# Patient Record
Sex: MALE | Race: WHITE | NOT HISPANIC OR LATINO
[De-identification: names, ages, dates, MRNs, and addresses within clinical notes are randomized per-mention and may not be internally consistent; named-entity substitution may affect disease eponyms.]

---

## 2023-02-20 ENCOUNTER — APPOINTMENT (OUTPATIENT)
Dept: ORTHOPEDIC SURGERY | Facility: CLINIC | Age: 48
End: 2023-02-20
Payer: COMMERCIAL

## 2023-02-20 VITALS — BODY MASS INDEX: 22.44 KG/M2 | HEIGHT: 67 IN | WEIGHT: 143 LBS

## 2023-02-20 DIAGNOSIS — Z82.49 FAMILY HISTORY OF ISCHEMIC HEART DISEASE AND OTHER DISEASES OF THE CIRCULATORY SYSTEM: ICD-10-CM

## 2023-02-20 DIAGNOSIS — M15.9 POLYOSTEOARTHRITIS, UNSPECIFIED: ICD-10-CM

## 2023-02-20 DIAGNOSIS — Z86.79 PERSONAL HISTORY OF OTHER DISEASES OF THE CIRCULATORY SYSTEM: ICD-10-CM

## 2023-02-20 DIAGNOSIS — M19.90 UNSPECIFIED OSTEOARTHRITIS, UNSPECIFIED SITE: ICD-10-CM

## 2023-02-20 PROCEDURE — 73080 X-RAY EXAM OF ELBOW: CPT | Mod: RT

## 2023-02-20 PROCEDURE — 72040 X-RAY EXAM NECK SPINE 2-3 VW: CPT

## 2023-02-20 PROCEDURE — 99204 OFFICE O/P NEW MOD 45 MIN: CPT

## 2023-02-20 PROCEDURE — 72100 X-RAY EXAM L-S SPINE 2/3 VWS: CPT

## 2023-02-23 ENCOUNTER — FORM ENCOUNTER (OUTPATIENT)
Age: 48
End: 2023-02-23

## 2023-02-24 ENCOUNTER — APPOINTMENT (OUTPATIENT)
Dept: MRI IMAGING | Facility: CLINIC | Age: 48
End: 2023-02-24
Payer: COMMERCIAL

## 2023-02-24 PROCEDURE — 72141 MRI NECK SPINE W/O DYE: CPT

## 2023-02-26 PROBLEM — M15.9 DJD (DEGENERATIVE JOINT DISEASE), MULTIPLE SITES: Status: RESOLVED | Noted: 2023-02-20 | Resolved: 2023-02-26

## 2023-02-26 PROBLEM — Z82.49 FAMILY HISTORY OF MYOCARDIAL INFARCTION: Status: ACTIVE | Noted: 2023-02-20

## 2023-02-26 PROBLEM — Z82.49 FAMILY HISTORY OF MITRAL VALVE PROLAPSE: Status: ACTIVE | Noted: 2023-02-20

## 2023-02-26 PROBLEM — Z86.79 HISTORY OF MITRAL VALVE PROLAPSE: Status: RESOLVED | Noted: 2023-02-20 | Resolved: 2023-02-26

## 2023-02-26 NOTE — PHYSICAL EXAM
[Biceps 2+] : biceps 2+ [Triceps 2+] : triceps 2+ [Brachioradialis 2+] : brachioradialis 2+ [Right] : right elbow [Pain with Flexion] : pain with flexion [5___] : supination 5[unfilled]/5 [3___] : right triceps 3[unfilled]/5 [Normal Coordination] : normal coordination [Normal DTR UE/LE] : normal DTR UE/LE  [Normal Sensation] : normal sensation [Normal Mood and Affect] : normal mood and affect [Orientated] : orientated [Able to Communicate] : able to communicate [Normal Skin] : normal skin [No Rash] : no rash [No Ulcers] : no ulcers [No Lesions] : no lesions [No obvious lymphadenopathy in areas examined] : no obvious lymphadenopathy in areas examined [Well Developed] : well developed [Well Nourished] : well nourished [Peripheral vascular exam is grossly normal] : peripheral vascular exam is grossly normal [No Respiratory Distress] : no respiratory distress [de-identified] : Constitutional:\par - General Appearance:\par Unremarkable\par Body Habitus\par Well Developed\par Well Nourished\par Body Habitus\par No Deformities\par Well Groomed\par Ability To communicate:\par Normal\par Neurologic:\par Global sensation is intact to upper and lower extremities. See examination of Neck and/or Spine\par for exceptions.\par Orientation to Time, Place and Person is: Normal\par Mood And Affect is Normal\par Skin:\par - Head/Face, Right Upper/Lower Extremity, Left Upper/Lower Extremity: Normal\par See Examination of Neck and/or Spine for exceptions\par Cardiovascular:\par Peripheral Cardiovascular System is Normal\par Palpation of Lymph Nodes:\par Normal Palpation of lymph nodes in: Axilla, Cervical, Inguinal\par Abnormal Palpation of lymph nodes in: None  [de-identified] : LUE intact  [] : non-antalgic

## 2023-02-26 NOTE — DATA REVIEWED
[I independently reviewed and interpreted images and report] : I independently reviewed and interpreted images and report [I reviewed the films/CD and additionally noted] : I reviewed the films/CD and additionally noted [FreeTextEntry3] : Right elbow x-ray taken in office demonstrates no fracture  [FreeTextEntry2] : Lumbar spine x-ray ap/lat taken in office demonstrates loss of disc space height L5/S1 [FreeTextEntry1] : I stop paperwork reviewed

## 2023-02-26 NOTE — HISTORY OF PRESENT ILLNESS
[Neck] : neck [Lower back] : lower back [10] : 10 [Localized] : localized [Shooting] : shooting [Stabbing] : stabbing [Household chores] : household chores [Sleep] : sleep [Lying in bed] : lying in bed [Retired] : Work status: retired [de-identified] : 02/20/2023 - 48 y/o M presenting for an eval of C and L spine. Long standing hx of progressive neck and lower back pain. Progressive symptoms over the years, he reports electric shock sensations in the neck and upper extremity. \par Also c/o b/l leg pain, however, patient states back pain > leg pain. Difficulty with posture with flare ups of back pain. Over the last couple years, he has been treating with medications, PT, yoga, stretching. hot/cold compresses to manage his pains. increased symptoms. No hx of spinal injections. 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 Also c/o focal elbow pain - onset of pain after grappling at [employer].  [] : no [FreeTextEntry1] : rt elbow [FreeTextEntry5] : neck and back issues from 1993 in the [employer]\par rt elbow from BJJ for 3 weeks [FreeTextEntry6] : spasms,pinching in neck [FreeTextEntry9] : stretch/yoga, CBD [de-identified] : [facility]  [de-identified] : in the past [de-identified] : xrays taken

## 2023-02-26 NOTE — DISCUSSION/SUMMARY
[de-identified] : Discussed continued medical mgmt and exercise based rehabilitation. Patient was advised  to work on stretching, strengthening and range of motion. Patient was provided with a lumbar and cervical home exercise program. I am requesting a cervical spine MRI, R/O HNP VS stenosis as source of RUE radic and triceps weakness (right triceps 3/5). He may be a candidate for interventional pain management in terms of epidural steroid injections although this will be determined upon review of the MRI. F/U after the study is complete. \par \par Prior to appointment and during encounter with patient extensive medical records were reviewed including but not limited to, hospital records, outpatient records, imaging results, and lab data.During this appointment the patient was examined, diagnoses were discussed and explained in a face to face manner. In addition extensive time was spent reviewing aforementioned diagnostic studies. Counseling including abnormal image results, differential diagnoses, treatment options, risk and benefits, lifestyle changes, current condition, and current medications was performed. Patient's comments, questions, and concerns were addressed and patient verbalized understanding. Based on this patient's presentation at our office, which is an orthopedic spine surgeon's office, this patient inherently / intrinsically has a risk, however minute, of developing issues such as Cauda equina syndrome, bowel and bladder changes, or progression of motor or neurological deficits such as paralysis which may be permanent. \par \par MABEL CARRANZA Acting as a Scribe for Dr. Todd BOTELLO, Mabel Carranza, attest that this documentation has been prepared under the direction and in the presence of Provider Jr River MD.

## 2023-03-08 ENCOUNTER — APPOINTMENT (OUTPATIENT)
Dept: ORTHOPEDIC SURGERY | Facility: CLINIC | Age: 48
End: 2023-03-08
Payer: COMMERCIAL

## 2023-03-08 VITALS — BODY MASS INDEX: 22.29 KG/M2 | HEIGHT: 67 IN | WEIGHT: 142 LBS

## 2023-03-08 DIAGNOSIS — M77.11 LATERAL EPICONDYLITIS, RIGHT ELBOW: ICD-10-CM

## 2023-03-08 DIAGNOSIS — M23.91 UNSPECIFIED INTERNAL DERANGEMENT OF RIGHT KNEE: ICD-10-CM

## 2023-03-08 DIAGNOSIS — M23.92 UNSPECIFIED INTERNAL DERANGEMENT OF LEFT KNEE: ICD-10-CM

## 2023-03-08 DIAGNOSIS — S46.911A STRAIN OF UNSPECIFIED MUSCLE, FASCIA AND TENDON AT SHOULDER AND UPPER ARM LEVEL, RIGHT ARM, INITIAL ENCOUNTER: ICD-10-CM

## 2023-03-08 PROCEDURE — 99214 OFFICE O/P EST MOD 30 MIN: CPT

## 2023-03-08 PROCEDURE — 73564 X-RAY EXAM KNEE 4 OR MORE: CPT | Mod: 50

## 2023-03-08 NOTE — PHYSICAL EXAM
[NL (90)] : supination 90 degrees [Left] : left knee [NL (0)] : extension 0 degrees [5___] : hamstring 5[unfilled]/5 [Positive] : positive Marvin [Right] : right elbow [] : no erythema [Bilateral] : knee bilaterally [There are no fractures, subluxations or dislocations. No significant abnormalities are seen] : There are no fractures, subluxations or dislocations. No significant abnormalities are seen [TWNoteComboBox7] : flexion 130 degrees

## 2023-03-08 NOTE — DISCUSSION/SUMMARY
[de-identified] : Progress Note completed by Guillermina Dawn PA-C\par * Dr. Bean -- The documentation recorded in this note accurately reflects the decisions made by me during this visit.

## 2023-03-08 NOTE — HISTORY OF PRESENT ILLNESS
[8] : 8 [0] : 0 [Sharp] : sharp [Shooting] : shooting [Stabbing] : stabbing [Throbbing] : throbbing [Intermittent] : intermittent [Full time] : Work status: full time [de-identified] : 3/8/23:  bilateral knee and right elbow pain.  [] : no [FreeTextEntry1] : b/l knees, right elbow [FreeTextEntry5] : Pt reports chronic bilateral knee pain for >10 years, he reports the knees have worsened and have begun to buckle. \par Pt also c.o right elbow injury that happened 2 months ago, he reports pain with ROM and stiffness.  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

## 2023-03-08 NOTE — ASSESSMENT
[FreeTextEntry1] : chronic bilateral knee pain and buckling.  no new injury.  left knee worse than right.  very tight hamstrings.  knees buckling and locking.  possible pf exacerbation, possible mmt, lmt. \par \par [facility] pain [facility].  lateral pain.  likely tennis elbow, common extensor strain.

## 2023-03-21 ENCOUNTER — RESULT REVIEW (OUTPATIENT)
Age: 48
End: 2023-03-21

## 2023-03-22 ENCOUNTER — APPOINTMENT (OUTPATIENT)
Dept: ORTHOPEDIC SURGERY | Facility: CLINIC | Age: 48
End: 2023-03-22

## 2023-03-24 ENCOUNTER — APPOINTMENT (OUTPATIENT)
Dept: ORTHOPEDIC SURGERY | Facility: CLINIC | Age: 48
End: 2023-03-24
Payer: COMMERCIAL

## 2023-03-24 VITALS — HEIGHT: 67 IN | BODY MASS INDEX: 22.29 KG/M2 | WEIGHT: 142 LBS

## 2023-03-24 PROCEDURE — 99214 OFFICE O/P EST MOD 30 MIN: CPT

## 2023-03-24 NOTE — HISTORY OF PRESENT ILLNESS
[Neck] : neck [5] : 5 [Dull/Aching] : dull/aching [Tightness] : tightness [] : yes [Occasional] : occasional [Meds] : meds [de-identified] : 3/24/23: Patient presenting for an MRI review of C Spine. Complaints of neck pain radiating into the bilateral upper extremities. Taking Gabapentin, 1 tablet at nighttime - which does provide some relief. Completing home based stretching exercises. Although he is making progress, remains significantly symptomatic. He reports progressive loss of dexterity and loss of b/l UE strength over the last year. Gait and balance is intact. \par \par 02/20/2023 - 48 y/o M presenting for an eval of C and L spine. Long standing hx of progressive neck and lower back pain. Progressive symptoms over the years, he reports electric shock sensations in the neck and upper extremity. \par Also c/o b/l leg pain, however, patient states back pain > leg pain. Difficulty with posture with flare ups of back pain. Over the last couple years, he has been treating with medications, PT, yoga, stretching. hot/cold compresses to manage his pains. increased symptoms. No hx of spinal injections. 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 Also c/o focal elbow pain - onset of pain after grappling at [employer]. \par \par  [de-identified] : MRI

## 2023-03-24 NOTE — DISCUSSION/SUMMARY
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 small left sided HNP C4/5 no nerve impingement. small rt sided HNP C7/T1 no nerve impingement. \par Discussed continued medical mgmt with Gabapentin and exercise based rehabilitation. Patient was advised  to work on stretching, strengthening and range of motion. Patient will not up-titrate Gabapentin due to reported side effects, continue qd. I am referring the patient to pain management to discuss trying C5/6 Epidural Spine Injections for pain relief. Continue to exhaust conservative management before considering ACDF. If sudden onset weakness or myelopathic symptoms, there would be more urgency for surgery. F/U in 4/5 weeks. \par \par Prior to appointment and during encounter with patient extensive medical records were reviewed including but not limited to, hospital records, outpatient records, imaging results, and lab data.During this appointment the patient was examined, diagnoses were discussed and explained in a face to face manner. In addition extensive time was spent reviewing aforementioned diagnostic studies. Counseling including abnormal image results, differential diagnoses, treatment options, risk and benefits, lifestyle changes, current condition, and current medications was performed. Patient's comments, questions, and concerns were addressed and patient verbalized understanding. Based on this patient's presentation at our office, which is an orthopedic spine surgeon's office, this patient inherently / intrinsically has a risk, however minute, of developing issues such as Cauda equina syndrome, bowel and bladder changes, or progression of motor or neurological deficits such as paralysis which may be permanent. \par \par MABEL CARRANZA Acting as a Scribe for Dr. River\allen I, Mabel Carranza, attest that this documentation has been prepared under the direction and in the presence of Provider Jr River MD.

## 2023-03-24 NOTE — PHYSICAL EXAM
[Normal Coordination] : normal coordination [Normal DTR UE/LE] : normal DTR UE/LE  [Normal Sensation] : normal sensation [Normal Mood and Affect] : normal mood and affect [Orientated] : orientated [Able to Communicate] : able to communicate [Normal Skin] : normal skin [No Rash] : no rash [No Ulcers] : no ulcers [No Lesions] : no lesions [No obvious lymphadenopathy in areas examined] : no obvious lymphadenopathy in areas examined [Well Developed] : well developed [Well Nourished] : well nourished [Peripheral vascular exam is grossly normal] : peripheral vascular exam is grossly normal [No Respiratory Distress] : no respiratory distress [3___] : right triceps 3[unfilled]/5 [Biceps 2+] : biceps 2+ [Triceps 2+] : triceps 2+ [Brachioradialis 2+] : brachioradialis 2+ [de-identified] : Constitutional:\par - General Appearance:\par Unremarkable\par Body Habitus\par Well Developed\par Well Nourished\par Body Habitus\par No Deformities\par Well Groomed\par Ability To communicate:\par Normal\par Neurologic:\par Global sensation is intact to upper and lower extremities. See examination of Neck and/or Spine\par for exceptions.\par Orientation to Time, Place and Person is: Normal\par Mood And Affect is Normal\par Skin:\par - Head/Face, Right Upper/Lower Extremity, Left Upper/Lower Extremity: Normal\par See Examination of Neck and/or Spine for exceptions\par Cardiovascular:\par Peripheral Cardiovascular System is Normal\par Palpation of Lymph Nodes:\par Normal Palpation of lymph nodes in: Axilla, Cervical, Inguinal\par Abnormal Palpation of lymph nodes in: None  [de-identified] : LUE intact  [] : non-antalgic

## 2023-04-24 ENCOUNTER — APPOINTMENT (OUTPATIENT)
Dept: PAIN MANAGEMENT | Facility: CLINIC | Age: 48
End: 2023-04-24
Payer: COMMERCIAL

## 2023-04-24 VITALS — HEIGHT: 67 IN | WEIGHT: 142 LBS | BODY MASS INDEX: 22.29 KG/M2

## 2023-04-24 DIAGNOSIS — M79.18 MYALGIA, OTHER SITE: ICD-10-CM

## 2023-04-24 DIAGNOSIS — M54.12 RADICULOPATHY, CERVICAL REGION: ICD-10-CM

## 2023-04-24 PROCEDURE — 99204 OFFICE O/P NEW MOD 45 MIN: CPT

## 2023-04-25 NOTE — PHYSICAL EXAM
[de-identified] : Constitutional:  \par - No acute distress  \par - Well developed; well nourished  \par \par Neurological:  \par - normal mood and affect  \par - alert and oriented x 3   \par \par Cardiovascular:  \par - grossly normal \par \par Cervical Spine Exam: \par \par Inspection:  \par erythema (-)  \par ecchymosis (-)  \par rashes (-)  \par \par Palpation:                                                   \par Cervical paraspinal tenderness:         R (-); L(-) \par Upper trapezius tenderness:              R (-); L (-) \par Rhomboids tenderness:                      R (-); L (-) \par Occipital Ridge:                                    R (-); L (-) \par Supraspinatus tenderness:                 R (-); L (-) \par \par ROM: Limited all planes pain with bilateral rotation, extension, and flexion\par \par Strength Testing:             \par Deltoid                           R (5/5); L (5/5) \par Biceps:                          R (5/5); L (5/5) \par Triceps:                         R (5/5); L (5/5) \par Finger Abductors:         R (5/5); L (5/5) \par Grasp:                           R (4/5); L (5/5) \par \par Special Testing: \par Spurling Test:                  R (EQ); L (EQ) \par Facet load test:               R (+); L (-) \par \par Neuro: \par SILT throughout right upper extremity with the exception of right C6 distribution\par SILT throughout left upper extremity \par \par Reflexes: \par Biceps   -           R (2+); L (2+) \par Triceps  -           R (2+); L (2+) \par Brachioradialis- R (2+); L (2+)   \par \par No ankle clonus

## 2023-04-25 NOTE — ASSESSMENT
[FreeTextEntry1] : A discussion regarding available pain management treatment options occurred with the patient.  These included interventional, rehabilitative, pharmacological, and alternative modalities. We will proceed with the following:  \par \par Interventional treatment options:  \par - Will proceed with C7-T1 MARCIN with fluoroscopic guidance\par - Consider TPI for refractory myofascial pain\par - see additional instructions below  \par \par Rehabilitative options:  \par - initiate trial of physical therapy once adequate relief\par - participation in active HEP was discussed  \par \par Medication based treatment options:  \par - continue gabapentin 300 mg; further titration based upon clinical response\par - Continue OTC NSAIDs on as-needed basis\par - see additional instructions below  \par \par Complementary treatment options:  \par - lifestyle modifications discussed  \par - Failed chiropractic care\par \par Additional treatment recommendations as follows:  \par - Follow up 1-2 weeks post injection for assessment of efficacy and further treatment recommendations\par \par We have discussed the risks, benefits, and alternatives NSAID therapy including but not limited to the risk of bleeding, thrombosis, gastric mucosal irritation/ulceration, allergic reaction and kidney dysfunction; the patient verbalizes an understanding.\par \par The risks, benefits and alternatives of the proposed procedure were explained in detail with the patient. The risks outlined include but are not limited to infection, bleeding, post- dural puncture headache, nerve injury, a temporary increase in pain, failure to resolve symptoms, allergic reaction, and possible elevation of blood sugar in diabetics if using corticosteroid.  All questions were answered to patient's apparent satisfaction and he/she verbalized an understanding.\par \par The documentation recorded by the scribe, in my presence, accurately reflects the service I personally performed and the decisions made by me with my edits as appropriate. \par \par I, Issac Ayers acting as scribe, attest that this documentation has been prepared under the direction and in the presence of Provider Mansoor Merchant DO.

## 2023-04-25 NOTE — HISTORY OF PRESENT ILLNESS
[FreeTextEntry1] : The patient presents for initial evaluation regarding their neck pain.   Patient was referred by Dr. 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 His pain is in the neck with radicular symptoms bilaterally to the arms R>L, he reports subjective weakness in both hands along with paraesthesias and fine motor changes R>L.  Patient currently takes one 300 mg gabapentin per day, with reported benefit.\par \par Subjective Weakness: Yes\par Numbness/Tingling: Yes\par Bladder/Bowel dysfunction: No \par Gait Abnormalities: No \par Fine motor coordination changes: Yes\par \par Injections: Yes\par \par Pertinent Surgical History: N/A \par \par Imaging: \par 1) MRI Cervical Spine (2/24/2023) - [facility]\par Findings: There is straightening of the cervical lordosis. There is slight convexity of the mid-to-lower cervical \par spine towards the right. There is multilevel disc desiccation. There is mild loss of disc height and degenerative \par endplate changes in the mid-to-lower cervical spine most severe posteriorly on the left at C5-C6. There are mild \par degenerative changes in the visualized upper thoracic spine. The visualized posterior fossa structures appear \par unremarkable. The cervical spinal cord appears intact. There are no gross paravertebral soft tissue masses.\par C2-C3: There is slight left paracentral disc bulging and bony ridging.\par C3-C4: There is mild diffuse disc bulging and bony ridging asymmetric towards the left with mild left foraminal narrowing.\par C4-C5: There is mild disc bulging, bony ridging, and bilateral uncovertebral hypertrophy left greater than right \par with additional left foraminal herniation impinging the left exiting C5 nerve root and left greater than right neural foraminal narrowing.\par C5-C6: There is disc bulging, bony ridging, uncovertebral hypertrophy, and broad-based posterior disc herniation asymmetric on the left impinging left greater than right exiting C6 nerve roots with left greater than right foraminal narrowing.\par C6-C7: There is disc bulging, bony ridging, uncovertebral hypertrophy, moderate right foraminal narrowing and \par mild left foraminal narrowing.\par C7-T1: There is disc bulging, bony ridging, uncovertebral hypertrophy, and mild bilateral foraminal narrowing.\par \par Physician Disclaimer: I have personally reviewed and confirmed all HPI data with the patient.

## 2023-04-28 ENCOUNTER — APPOINTMENT (OUTPATIENT)
Dept: ORTHOPEDIC SURGERY | Facility: CLINIC | Age: 48
End: 2023-04-28

## 2023-08-02 ENCOUNTER — APPOINTMENT (OUTPATIENT)
Dept: ORTHOPEDIC SURGERY | Facility: CLINIC | Age: 48
End: 2023-08-02
Payer: COMMERCIAL

## 2023-08-02 VITALS — BODY MASS INDEX: 21.97 KG/M2 | HEIGHT: 67 IN | WEIGHT: 140 LBS

## 2023-08-02 PROCEDURE — 72100 X-RAY EXAM L-S SPINE 2/3 VWS: CPT

## 2023-08-02 PROCEDURE — 99213 OFFICE O/P EST LOW 20 MIN: CPT

## 2023-08-02 PROCEDURE — 99203 OFFICE O/P NEW LOW 30 MIN: CPT

## 2023-08-02 NOTE — ASSESSMENT
[FreeTextEntry1] : 49 y/o male with cervical radiculopathy, lumbar DDD and lumbar radiculopathy. As the patient has been experiencing RLE radiculopathy along with right EHL weakness and has been refractory to Gabapentin, NSAID therapy >2 months, I am requesting a lumbar MRI to eval for disc herniation. I am prescribing the patient a Medrol dose pack for symptom control. F/U in 1-2 weeks to review MRI.   Prior to appointment and during encounter with patient extensive medical records were reviewed including but not limited to, hospital records, out patient records, imaging results, and lab data. During this appointment the patient was examined, diagnoses were discussed and explained in a face to face manner. In addition extensive time was spent reviewing aforementioned diagnostic studies. Counseling including abnormal image results, differential diagnoses, treatment options, risk and benefits, lifestyle changes, current condition, and current medications was performed. Patient's comments, questions, and concerns were address and patient verbalized understanding. Based on this patient's presentation at our office, which is an orthopedic spine surgeon's office, this patient inherently / intrinsically has a risk, however minute, of developing issues such as Cauda equina syndrome, bowel and bladder changes, or progression of motor or neurological deficits such as paralysis which may be permanent.   I, Torie Rosado, attest that this documentation has been prepared under the direction and in the presence of provider Gaudencio Blair MD.

## 2023-08-02 NOTE — HISTORY OF PRESENT ILLNESS
[8] : 8 [5] : 5 [Dull/Aching] : dull/aching [Radiating] : radiating [Sharp] : sharp [Constant] : constant [Meds] : meds [Sitting] : sitting [Bending forward] : bending forward [de-identified] : 08/02/2023: Patient presenting today for an initial evaluation.  Lumbar: History of chronic back pain since he was 17 years old. Reports an incident 2 months prior where he was carrying tackle in his backpack, went on his boat (possibility of bearing too much weight), later that night he started to experience burning and numbness in right calf. States he immediately started to treat pain with TENS unit, provided mild relief in symptoms. Treats with NSAIDs PRN (ibuprofen, 2-3 tablets) and states minimal relief in symptoms. Treating with Gabapentin 300 mg, 3x a week. Was treated at another practice in the past and states they indicated him for lumbar surgery but he did not proceed. Seen by Dr. River previously. Chief complaint is radiculopathy in RLE.  Cervical: History of neck pain. States when back pain is in severe pain it causes neck and shoulder pain with radiculopathy in UE b/l.  States he is managing neck pain with Gabapentin at night and is cautious of body mechanics to avoid aggravation of symptoms. Indicated for MARCIN by  but has denied due to phobia of needles.   Occupation: Retired from [employer] department   [] : no [FreeTextEntry1] : L-spine [FreeTextEntry5] :  Pt. is a 48 year y/o M who presents for l-spine pain. Pt has had ongoing pain for around 2 months  [FreeTextEntry7] : MIKE feet [de-identified] : carrying objects

## 2023-08-02 NOTE — PHYSICAL EXAM
[4___] : right extensor hallicus longus 4[unfilled]/5 [Right lower extremity below knee] : right lower extremity below knee [de-identified] : Constitutional: - General Appearance: Unremarkable Body Habitus Well Developed Well Nourished Body Habitus No Deformities Well Groomed Ability To communicate: Normal Neurologic: Global sensation is intact to upper and lower extremities. See examination of Neck and/or Spine for exceptions. Orientation to Time, Place and Person is: Normal Mood And Affect is Normal Skin: - Head/Face, Right Upper/Lower Extremity, Left Upper/Lower Extremity: Normal See Examination of Neck and/or Spine for exceptions Cardiovascular: Peripheral Cardiovascular System is Normal Palpation of Lymph Nodes: Normal Palpation of lymph nodes in: Axilla, Cervical, Inguinal Abnormal Palpation of lymph nodes in: None  [TWNoteComboBox7] : forward flexion 10 degrees [de-identified] : extension 10 degrees [de-identified] : left lateral rotation 25 degrees [TWNoteComboBox6] : right lateral rotation 25 degrees [] : non-antalgic [de-identified] : L4/5 and S1 paranesthesia's on right [FreeTextEntry1] : On my interpretations of these images from [facility] in house on 08/02/2023: continued L5-S1 mod DDD no significant changes from February 2023

## 2023-08-02 NOTE — DATA REVIEWED
[FreeTextEntry1] : 2/20/2023: lumbar x-ray shows mild scoliotic deformity, degenerative disease L5-S1 mod w/ mild to adv facet arthropathy and retrolisthesis and mild degenerative disease in the levels above.

## 2023-08-07 ENCOUNTER — APPOINTMENT (OUTPATIENT)
Dept: MRI IMAGING | Facility: CLINIC | Age: 48
End: 2023-08-07
Payer: COMMERCIAL

## 2023-08-07 PROCEDURE — 72148 MRI LUMBAR SPINE W/O DYE: CPT

## 2023-08-25 ENCOUNTER — APPOINTMENT (OUTPATIENT)
Dept: ORTHOPEDIC SURGERY | Facility: CLINIC | Age: 48
End: 2023-08-25
Payer: COMMERCIAL

## 2023-08-25 VITALS — BODY MASS INDEX: 21.97 KG/M2 | WEIGHT: 140 LBS | HEIGHT: 67 IN

## 2023-08-25 DIAGNOSIS — M54.16 RADICULOPATHY, LUMBAR REGION: ICD-10-CM

## 2023-08-25 DIAGNOSIS — M47.816 SPONDYLOSIS W/OUT MYELOPATHY OR RADICULOPATHY, LUMBAR REGION: ICD-10-CM

## 2023-08-25 PROCEDURE — 99214 OFFICE O/P EST MOD 30 MIN: CPT

## 2023-08-30 PROBLEM — M47.816 LUMBAR SPONDYLOSIS: Status: ACTIVE | Noted: 2023-08-30

## 2023-08-30 PROBLEM — M54.16 LUMBAR RADICULOPATHY: Noted: 2023-02-20

## 2023-08-30 NOTE — ASSESSMENT
[FreeTextEntry1] : 47 y/o male with right L5-S1 foraminal stenosis, lumbar DDD, lumbar radiculopathy and cervical radiculopathy. Continue treating with Gabapentin, increase dosage twice a day, TID if needed. Patient has denied seeking treatment with pain management, due to phobia of needles. If he changes his mind, he was advised to give the office a call. Discussed possible interventional spine injections vs surgical decompression, possible candidate for a spinal fusion surgery.  F/U as needed.   Prior to appointment and during encounter with patient extensive medical records were reviewed including but not limited to, hospital records, out patient records, imaging results, and lab data. During this appointment the patient was examined, diagnoses were discussed and explained in a face to face manner. In addition extensive time was spent reviewing aforementioned diagnostic studies. Counseling including abnormal image results, differential diagnoses, treatment options, risk and benefits, lifestyle changes, current condition, and current medications was performed. Patient's comments, questions, and concerns were address and patient verbalized understanding. Based on this patient's presentation at our office, which is an orthopedic spine surgeon's office, this patient inherently / intrinsically has a risk, however minute, of developing issues such as Cauda equina syndrome, bowel and bladder changes, or progression of motor or neurological deficits such as paralysis which may be permanent.   I, Torie Rosado, attest that this documentation has been prepared under the direction and in the presence of provider Gaudencio Blair MD.

## 2023-08-30 NOTE — PHYSICAL EXAM
[Right lower extremity below knee] : right lower extremity below knee [4___] : left extensor hallicus longus 4[unfilled]/5 [de-identified] : Constitutional: - General Appearance: Unremarkable Body Habitus Well Developed Well Nourished Body Habitus No Deformities Well Groomed Ability To communicate: Normal Neurologic: Global sensation is intact to upper and lower extremities. See examination of Neck and/or Spine for exceptions. Orientation to Time, Place and Person is: Normal Mood And Affect is Normal Skin: - Head/Face, Right Upper/Lower Extremity, Left Upper/Lower Extremity: Normal See Examination of Neck and/or Spine for exceptions Cardiovascular: Peripheral Cardiovascular System is Normal Palpation of Lymph Nodes: Normal Palpation of lymph nodes in: Axilla, Cervical, Inguinal Abnormal Palpation of lymph nodes in: None  [TWNoteComboBox7] : forward flexion 10 degrees [de-identified] : extension 10 degrees [de-identified] : left lateral rotation 25 degrees [TWNoteComboBox6] : right lateral rotation 25 degrees [] : non-antalgic [de-identified] : L5 paranesthesia's on right [FreeTextEntry1] : On my interpretations of these images from [facility] in house on 08/02/2023: continued L5-S1 mod DDD no significant changes from February 2023

## 2023-08-30 NOTE — DATA REVIEWED
[FreeTextEntry1] : On my interpretation of these images from [facility] on  L5-S1: mod DDD w/ mod b/l fs L>R L4-5: mild to mod DDD schmorl's node at the posterior right endplate mild b/l fs  L3-4: mild DDD  L2-3: mild DDD  L1-2: mild DDD  T12-L1: mild DDD schmorl's node at superior endplate, chronic

## 2023-08-30 NOTE — HISTORY OF PRESENT ILLNESS
[8] : 8 [5] : 5 [Dull/Aching] : dull/aching [Radiating] : radiating [Sharp] : sharp [Constant] : constant [Meds] : meds [Sitting] : sitting [Bending forward] : bending forward [de-identified] : 08/25/2023: Patient presenting today for an MRI results review. Similar symptoms to last visit. Reports back pain with radicular symptoms to RLE, states burning sensation in right knee and posterior thigh to posterior calf. Treats with Gabapentin 300mg at night. Recently stopped working. Had a consult with pain management for cervical, did not receive injection.   08/02/2023: Patient presenting today for an initial evaluation.  Lumbar: History of chronic back pain since he was 17 years old. Reports an incident 2 months prior where he was carrying tackle in his backpack, went on his boat (possibility of bearing too much weight), later that night he started to experience burning and numbness in right calf. States he immediately started to treat pain with TENS unit, provided mild relief in symptoms. Treats with NSAIDs PRN (ibuprofen, 2-3 tablets) and states minimal relief in symptoms. Treating with Gabapentin 300 mg, 3x a week. Was treated at another practice in the past and states they indicated him for lumbar surgery but he did not proceed. Seen by Dr. River previously. Chief complaint is radiculopathy in RLE.  Cervical: History of neck pain. States when back pain is in severe pain it causes neck and shoulder pain with radiculopathy in UE b/l.  States he is managing neck pain with Gabapentin at night and is cautious of body mechanics to avoid aggravation of symptoms. Indicated for MARCIN by Dr. Merchant but has denied due to phobia of needles.   Occupation: Retired from [employer]   [] : no [FreeTextEntry1] : L-spine [FreeTextEntry5] :  Pt. is a 48 year y/o M who presents for l-spine pain. Pt has had ongoing pain for around 2 months  [FreeTextEntry7] : MIKE feet [de-identified] : carrying objects

## 2024-03-01 ENCOUNTER — APPOINTMENT (OUTPATIENT)
Dept: ORTHOPEDIC SURGERY | Facility: CLINIC | Age: 49
End: 2024-03-01
Payer: COMMERCIAL

## 2024-03-01 VITALS — HEIGHT: 67 IN | WEIGHT: 140 LBS | BODY MASS INDEX: 21.97 KG/M2

## 2024-03-01 DIAGNOSIS — M47.22 OTHER SPONDYLOSIS WITH RADICULOPATHY, CERVICAL REGION: ICD-10-CM

## 2024-03-01 PROCEDURE — 99214 OFFICE O/P EST MOD 30 MIN: CPT

## 2024-03-01 RX ORDER — GABAPENTIN 300 MG/1
300 CAPSULE ORAL
Qty: 30 | Refills: 2 | Status: ACTIVE | COMMUNITY
Start: 2023-02-20 | End: 1900-01-01

## 2024-03-01 RX ORDER — METHYLPREDNISOLONE 4 MG/1
4 TABLET ORAL AS DIRECTED
Qty: 1 | Refills: 0 | Status: DISCONTINUED | COMMUNITY
Start: 2023-08-02 | End: 2024-03-01

## 2024-03-10 PROBLEM — M47.22 CERVICAL SPONDYLOSIS WITH RADICULOPATHY: Status: ACTIVE | Noted: 2023-04-24

## 2024-03-10 NOTE — PHYSICAL EXAM
[4___] : right extensor hallicus longus 4[unfilled]/5 [de-identified] : Constitutional: - General Appearance: Unremarkable Body Habitus Well Developed Well Nourished Body Habitus No Deformities Well Groomed Ability To communicate: Normal Neurologic: Global sensation is intact to upper and lower extremities. See examination of Neck and/or Spine for exceptions. Orientation to Time, Place and Person is: Normal Mood And Affect is Normal Skin: - Head/Face, Right Upper/Lower Extremity, Left Upper/Lower Extremity: Normal See Examination of Neck and/or Spine for exceptions Cardiovascular: Peripheral Cardiovascular System is Normal Palpation of Lymph Nodes: Normal Palpation of lymph nodes in: Axilla, Cervical, Inguinal Abnormal Palpation of lymph nodes in: None  [TWNoteComboBox7] : forward flexion 10 degrees [de-identified] : extension 10 degrees [de-identified] : left lateral rotation 25 degrees [TWNoteComboBox6] : right lateral rotation 25 degrees [] : non-antalgic [de-identified] : L5 paranesthesia's on right [FreeTextEntry1] : On my interpretations of these images from [facility] in house on 08/02/2023: continued L5-S1 mod DDD no significant changes from February 2023

## 2024-03-10 NOTE — HISTORY OF PRESENT ILLNESS
[Lower back] : lower back [Neck] : neck [3] : 3 [7] : 7 [Dull/Aching] : dull/aching [Intermittent] : intermittent [Household chores] : household chores [Coughing] : coughing [Exercising] : exercising [Retired] : Work status: retired [de-identified] : 3/1/2024: Patient presents today for a follow-up visit regarding neck pain and right upper extremity numbness and tingling. Patient reports his neck pain was exacerbated a few times since prior visit specifically with sneezing and then recalls a few episodes due to bending the wrong way. He states the numbness and tingling is worst on the right arm, especially when he lays down. Patient also notes he went to his primary care physician and diagnosed him with a right-sided inguinal hernia - surgery is scheduled.   08/25/2023: Patient presenting today for an MRI results review. Similar symptoms to last visit. Reports back pain with radicular symptoms to RLE, states burning sensation in right knee and posterior thigh to posterior calf. Treats with Gabapentin 300mg at night. Recently stopped working. Had a consult with pain management for cervical, did not receive injection.   08/02/2023: Patient presenting today for an initial evaluation.  Lumbar: History of chronic back pain since he was 17 years old. Reports an incident 2 months prior where he was carrying tackle in his backpack, went on his boat (possibility of bearing too much weight), later that night he started to experience burning and numbness in right calf. States he immediately started to treat pain with TENS unit, provided mild relief in symptoms. Treats with NSAIDs PRN (ibuprofen, 2-3 tablets) and states minimal relief in symptoms. Treating with Gabapentin 300 mg, 3x a week. Was treated at another practice in the past and states they indicated him for lumbar surgery but he did not proceed. Seen by Dr. River previously. Chief complaint is radiculopathy in RLE.  Cervical: History of neck pain. States when back pain is in severe pain it causes neck and shoulder pain with radiculopathy in UE b/l.  States he is managing neck pain with Gabapentin at night and is cautious of body mechanics to avoid aggravation of symptoms. Indicated for MARCIN by Dr. Merchant but has denied due to phobia of needles.   Occupation: Retired from [employer]   [FreeTextEntry7] : bilateral feet, hands [] : no [FreeTextEntry9] : Exercising [de-identified] : Sneezing [de-identified] : Gabapentin 300mg QD, heat and stretching

## 2024-03-10 NOTE — DISCUSSION/SUMMARY
[Medication Risks Reviewed] : Medication risks reviewed [Surgical risks reviewed] : Surgical risks reviewed [de-identified] : 49 y/o male with right L5-S1 foraminal stenosis, lumbar DDD, lumbar radiculopathy and cervical radiculopathy. Patient wishes to exhaust conservative treatment options before considering surgical intervention. Patient is going to address his hernia first. Continue treating with Gabapentin 300mg, TID if needed. Advised patient the risks involved with taking prescription medications daily and risk of high-dependency - patient expresses understanding and reports they will be compliant. Patient has denied seeking treatment with pain management once again due to phobia of needles. If he changes his mind, he was advised to give the office a call. Discussed possible interventional spine injections vs surgical decompression, possible candidate for a spinal fusion surgery. F/U as needed.   Prior to appointment and during encounter with patient extensive medical records were reviewed including but not limited to, hospital records, out patient records, imaging results, and lab data. During this appointment the patient was examined, diagnoses were discussed and explained in a face to face manner. In addition extensive time was spent reviewing aforementioned diagnostic studies. Counseling including abnormal image results, differential diagnoses, treatment options, risk and benefits, lifestyle changes, current condition, and current medications was performed. Patient's comments, questions, and concerns were address and patient verbalized understanding. Based on this patient's presentation at our office, which is an orthopedic spine surgeon's office, this patient inherently / intrinsically has a risk, however minute, of developing issues such as Cauda equina syndrome, bowel and bladder changes, or progression of motor or neurological deficits such as paralysis which may be permanent.  I, Sirena Borrego, attest that this documentation has been prepared under the direction and in the presence of provider Gaudencio Blair MD.

## 2024-03-10 NOTE — REASON FOR VISIT
[FreeTextEntry2] : Pt here for a follow up visit for Neck and Back pain, also requesting refill of Gabapentin 300mg.

## 2025-08-01 ENCOUNTER — OFFICE (OUTPATIENT)
Dept: URBAN - METROPOLITAN AREA CLINIC 115 | Facility: CLINIC | Age: 50
Setting detail: OPHTHALMOLOGY
End: 2025-08-01
Payer: COMMERCIAL

## 2025-08-01 DIAGNOSIS — S05.02XA: ICD-10-CM

## 2025-08-01 PROCEDURE — 92002 INTRM OPH EXAM NEW PATIENT: CPT | Performed by: OPTOMETRIST

## 2025-08-01 ASSESSMENT — CONFRONTATIONAL VISUAL FIELD TEST (CVF)
OD_FINDINGS: FULL
OS_FINDINGS: FULL

## 2025-08-01 ASSESSMENT — REFRACTION_AUTOREFRACTION
OS_SPHERE: -0.50
OD_SPHERE: -2.75
OS_CYLINDER: +0.50
OS_AXIS: 010
OD_CYLINDER: +2.75
OD_AXIS: 178

## 2025-08-01 ASSESSMENT — KERATOMETRY
OS_K2POWER_DIOPTERS: 43.00
OD_K1POWER_DIOPTERS: 41.75
OS_AXISANGLE_DEGREES: 090
OD_K2POWER_DIOPTERS: 43.75
OD_AXISANGLE_DEGREES: 177
OS_K1POWER_DIOPTERS: 43.00

## 2025-08-01 ASSESSMENT — VISUAL ACUITY
OS_BCVA: 20/25
OD_BCVA: 20/20

## 2025-08-01 ASSESSMENT — CORNEAL TRAUMA - ABRASION: OD_ABRASION: PRESENT

## 2025-08-01 ASSESSMENT — REFRACTION_MANIFEST
OS_VA1: 20/20
OS_AXIS: 010
OD_CYLINDER: +2.75
OD_VA1: 20/20-1
OS_SPHERE: -0.75
OS_CYLINDER: +0.25
OD_SPHERE: -2.75
OD_AXIS: 180

## 2025-08-06 ENCOUNTER — OFFICE (OUTPATIENT)
Dept: URBAN - METROPOLITAN AREA CLINIC 115 | Facility: CLINIC | Age: 50
Setting detail: OPHTHALMOLOGY
End: 2025-08-06
Payer: COMMERCIAL

## 2025-08-06 DIAGNOSIS — S05.02XD: ICD-10-CM

## 2025-08-06 PROCEDURE — 92012 INTRM OPH EXAM EST PATIENT: CPT | Performed by: OPTOMETRIST

## 2025-08-06 ASSESSMENT — REFRACTION_MANIFEST
OD_CYLINDER: +2.75
OS_SPHERE: -0.75
OD_SPHERE: -2.75
OD_VA1: 20/20-1
OS_VA1: 20/20
OS_CYLINDER: +0.25
OD_AXIS: 180
OS_AXIS: 010

## 2025-08-06 ASSESSMENT — REFRACTION_CURRENTRX
OS_OVR_VA: 20/
OD_VPRISM_DIRECTION: PROGS
OD_CYLINDER: -3.00
OD_OVR_VA: 20/
OS_ADD: +1.75
OS_SPHERE: +1.25
OD_SPHERE: +0.75
OS_AXIS: 117
OD_ADD: +1.75
OD_AXIS: 083
OS_CYLINDER: -1.25
OS_VPRISM_DIRECTION: PROGS

## 2025-08-06 ASSESSMENT — REFRACTION_AUTOREFRACTION
OS_SPHERE: +0.50
OD_AXIS: 102
OD_SPHERE: -1.00
OS_AXIS: 115
OD_CYLINDER: -1.75
OS_CYLINDER: -0.75

## 2025-08-06 ASSESSMENT — VISUAL ACUITY
OS_BCVA: 20/20-1
OD_BCVA: 20/20

## 2025-08-06 ASSESSMENT — KERATOMETRY
OS_AXISANGLE_DEGREES: 090
OD_K2POWER_DIOPTERS: 43.75
OD_AXISANGLE_DEGREES: 177
OS_K2POWER_DIOPTERS: 43.00
OD_K1POWER_DIOPTERS: 41.75
OS_K1POWER_DIOPTERS: 43.00

## 2025-08-06 ASSESSMENT — CORNEAL TRAUMA - ABRASION: OD_ABRASION: PRESENT

## 2025-08-06 ASSESSMENT — TONOMETRY
OS_IOP_MMHG: 14
OD_IOP_MMHG: 13

## 2025-08-06 ASSESSMENT — CONFRONTATIONAL VISUAL FIELD TEST (CVF)
OS_FINDINGS: FULL
OD_FINDINGS: FULL